# Patient Record
Sex: FEMALE | Race: WHITE | NOT HISPANIC OR LATINO | Employment: FULL TIME | URBAN - METROPOLITAN AREA
[De-identification: names, ages, dates, MRNs, and addresses within clinical notes are randomized per-mention and may not be internally consistent; named-entity substitution may affect disease eponyms.]

---

## 2017-02-13 ENCOUNTER — HOSPITAL ENCOUNTER (OUTPATIENT)
Dept: OCCUPATIONAL THERAPY | Facility: CLINIC | Age: 37
Setting detail: THERAPIES SERIES
End: 2017-02-13
Attending: PHYSICIAN ASSISTANT
Payer: COMMERCIAL

## 2017-02-13 PROCEDURE — 97140 MANUAL THERAPY 1/> REGIONS: CPT | Mod: GO | Performed by: OCCUPATIONAL THERAPIST

## 2017-02-13 PROCEDURE — 97165 OT EVAL LOW COMPLEX 30 MIN: CPT | Mod: GO | Performed by: OCCUPATIONAL THERAPIST

## 2017-02-13 PROCEDURE — 40000445 ZZHC STATISTIC OT VISIT, LYMPHEDEMA: Performed by: OCCUPATIONAL THERAPIST

## 2017-02-14 NOTE — PROGRESS NOTES
02/13/17 1500   Rehab Discipline   Discipline OT   Type of Visit   Type of visit Initial Edema Evaluation       present No   General Information   Start of care 02/13/17   Referring physician Madeline Otto PA-C   Orders Evaluate and treat as indicated   Order date 12/20/16   Medical diagnosis BLE lymphedema   Onset of illness / date of surgery 12/20/16   Edema onset 02/13/05   Affected body parts LLE;RLE   Edema etiology Congenital   Edema etiology comments Pt has had since puberty.  Her father and son also have it.   Pertinent history of current problem (PT: include personal factors and/or comorbidities that impact the POC; OT: include additional occupational profile info) Pt has congenital lymphedema.   Surgical / medical history reviewed Yes   Edema special tests Lymphoscintography  (Pt reports no leg lns.)   Prior level of functional mobility Pt is independent with ADLs.  She has a hard time finding shoes and socks and pants to fit over her lower legs.  She cannot find boots to wear in the winter.  Climbing stairs is difficult with the heaviness of the legs.  She gets high pain 8.5/10 after being on her feet for work.  She gets frequent cellulitis infections.  2 in the past month.   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   Fall Screening   Fall screen completed by OT   Per patient, fall 2 or more times in past year? Yes   Per patient, fall with injury in past year? No   Is patient a fall risk? No   Fall screen comments Falls only on ice   System Outcome Measures   Outcome  Measures Lymphedema   Lymphedema Life Impact Scale (score range 0-72). A higher score indicates greater impairment. 40   Subjective Report   Patient report of symptoms Pt has high pain from working on feet.   Patient / Family Goals   Patient / family goals statement Pt wishes to have less pain at the end of her work day.  She would love to find pants to fit on her thighs and hips after they fit the calves.   Pain   Patient currently in pain Yes   Pain location B feet   Pain rating 1-2/10 current, but flares to 8/10 with work.   Pain description Ache   Cognitive Status   Orientation Orientation to person, place and time   Level of consciousness Alert   Follows commands and answers questions 100% of the time   Personal safety and judgement Intact   Memory Intact   Edema Exam / Assessment   Skin condition Pitting;Dryness   Skin condition comments Pt has thick fibrosis from toes to knees.  She has peau d orange texture on BLES.  She has lichenization of hyperkeratosis of the L toes, a stage III skin change.   Pitting 4+   Pitting location pretibial and foot   Scar No   Capillary refill Symmetrical   Dorsal pedal pulse Symmetrical   Stemmer sign Positive   Stemmer sign comments In BLEs.   Ulceration No   Ulceration comments Pt has multiple scratches and nonhealing spots from shaving/bumping into a table.   Girth Measurements   Girth Measurements Refer to separate girth measurement flowsheet   Volume LE   Right LE (mL) 20176   Left LE (mL) 50009   LE volume comparison LLE volume greater than RLE volume   % difference 5   Range of Motion   ROM Other   ROM comments Pt is limited at ankle and knee by swelling.   Strength   Strength No deficits were identified   Posture   Posture Normal   Palpation   Palpation Pt has no pain on palpation.   Activities of Daily Living   Activities of Daily Living Pt has high pain with work days.  She cannot fit into pants easily.   Bed Mobility   Bed mobility Pt is independent.   Transfers  "  Transfers Pt is independent.   Gait / Locomotion   Gait / Locomotion Pt is independent.  She is walking on the lateral aspect of her feet to make room for the calves with gait.   Sensory   Sensory perception Light touch   Light touch Intact   Vascular Assessment   Vascular Assessment Comments no concerns   Coordination   Coordination Gross motor coordination appropriate   Muscle Tone   Muscle tone No deficits were identified   Planned Edema Interventions   Planned edema interventions Manual lymph drainage;Gradient compression bandaging;Fit for compression garment;Exercises;Precautions to prevent infection / exacerbation;Education;Manual therapy;ADL training;Skin care / precautions;Home management program development   Clinical Impression   Criteria for skilled therapeutic intervention met Yes   Therapy diagnosis Lymphedema   Influenced by the following impairments / conditions Edema;Stage 3;Primary Lymphedema   Assessment of Occupational Performance 3-5 Performance Deficits   Identified Performance Deficits Work pain, shoes/boots to fit, pants to fit, legs are heavy to climb stairs.   Clinical Decision Making (Complexity) Moderate complexity   Treatment frequency 5 times / week   Treatment duration 4 weeks, then 0x/week for 3 weeks, then once a week for one week.   Patient / family and/or staff in agreement with plan of care Yes   Risks and benefits of therapy have been explained Yes   Education Assessment   Preferred learning style Listening;Reading;Demonstration;Pictures / video   Goals   Edema Eval Goals 5;6   Goal 1   Goal identifier 1   Goal description In order to improve functional mobility for activities of living, by the completion of intensive treatment, patient and/or caregiver will;\"Verbalize and/or demonstrate understanding of techniques to independently manage their lymphedema at home   Target date 04/14/17   Goal 2   Goal identifier 1a   Goal description tolerate gradient compression " bandaging/wearing compression garments 23 hrs/day to decrease volume of extracellular fluid   Target date 04/14/17   Goal 3   Goal identifier 1b   Goal description demonstrate independence in applying gradient compression bandages    Target date 04/14/17   Goal 4   Goal identifier 1c   Goal description demonstrate independence in performing prescribed exercises to facilate the lymph system   Target date 04/14/17   Goal 5   Goal identifier 1d   Goal description be independent in donning/doffing, wearing schedule, and care of compression garments   Target date 04/14/17   Goal 6   Goal identifier 2   Goal description Pt will reduce legs below knee 5% of total girth to decrease work pain to 4/10 end of shift.   Target date 04/14/17   Total Evaluation Time   Total evaluation time 45

## 2017-02-20 ENCOUNTER — HOSPITAL ENCOUNTER (OUTPATIENT)
Dept: OCCUPATIONAL THERAPY | Facility: CLINIC | Age: 37
Setting detail: THERAPIES SERIES
End: 2017-02-20
Attending: PHYSICIAN ASSISTANT
Payer: COMMERCIAL

## 2017-02-20 PROCEDURE — 40000445 ZZHC STATISTIC OT VISIT, LYMPHEDEMA: Performed by: OCCUPATIONAL THERAPIST

## 2017-02-20 PROCEDURE — 97140 MANUAL THERAPY 1/> REGIONS: CPT | Mod: GO | Performed by: OCCUPATIONAL THERAPIST

## 2017-02-21 ENCOUNTER — HOSPITAL ENCOUNTER (OUTPATIENT)
Dept: OCCUPATIONAL THERAPY | Facility: CLINIC | Age: 37
Setting detail: THERAPIES SERIES
End: 2017-02-21
Attending: PHYSICIAN ASSISTANT
Payer: COMMERCIAL

## 2017-02-21 PROCEDURE — 40000445 ZZHC STATISTIC OT VISIT, LYMPHEDEMA: Performed by: OCCUPATIONAL THERAPIST

## 2017-02-21 PROCEDURE — 97140 MANUAL THERAPY 1/> REGIONS: CPT | Mod: GO | Performed by: OCCUPATIONAL THERAPIST

## 2017-02-22 ENCOUNTER — HOSPITAL ENCOUNTER (OUTPATIENT)
Dept: OCCUPATIONAL THERAPY | Facility: CLINIC | Age: 37
Setting detail: THERAPIES SERIES
End: 2017-02-22
Attending: PHYSICIAN ASSISTANT
Payer: COMMERCIAL

## 2017-02-22 PROCEDURE — 40000445 ZZHC STATISTIC OT VISIT, LYMPHEDEMA: Performed by: OCCUPATIONAL THERAPIST

## 2017-02-22 PROCEDURE — 97140 MANUAL THERAPY 1/> REGIONS: CPT | Mod: GO | Performed by: OCCUPATIONAL THERAPIST

## 2017-02-23 ENCOUNTER — HOSPITAL ENCOUNTER (OUTPATIENT)
Dept: OCCUPATIONAL THERAPY | Facility: CLINIC | Age: 37
Setting detail: THERAPIES SERIES
End: 2017-02-23
Attending: PHYSICIAN ASSISTANT
Payer: COMMERCIAL

## 2017-02-23 PROCEDURE — 40000445 ZZHC STATISTIC OT VISIT, LYMPHEDEMA: Performed by: OCCUPATIONAL THERAPIST

## 2017-02-23 PROCEDURE — 97140 MANUAL THERAPY 1/> REGIONS: CPT | Mod: GO | Performed by: OCCUPATIONAL THERAPIST

## 2017-02-24 ENCOUNTER — HOSPITAL ENCOUNTER (OUTPATIENT)
Dept: OCCUPATIONAL THERAPY | Facility: CLINIC | Age: 37
Setting detail: THERAPIES SERIES
End: 2017-02-24
Attending: PHYSICIAN ASSISTANT
Payer: COMMERCIAL

## 2017-02-24 PROCEDURE — 97140 MANUAL THERAPY 1/> REGIONS: CPT | Mod: GO | Performed by: OCCUPATIONAL THERAPIST

## 2017-02-24 PROCEDURE — 40000445 ZZHC STATISTIC OT VISIT, LYMPHEDEMA: Performed by: OCCUPATIONAL THERAPIST

## 2017-02-27 ENCOUNTER — HOSPITAL ENCOUNTER (OUTPATIENT)
Dept: OCCUPATIONAL THERAPY | Facility: CLINIC | Age: 37
Setting detail: THERAPIES SERIES
End: 2017-02-27
Attending: PHYSICIAN ASSISTANT
Payer: COMMERCIAL

## 2017-02-27 PROCEDURE — 97140 MANUAL THERAPY 1/> REGIONS: CPT | Mod: GO | Performed by: OCCUPATIONAL THERAPIST

## 2017-02-27 PROCEDURE — 40000445 ZZHC STATISTIC OT VISIT, LYMPHEDEMA: Performed by: OCCUPATIONAL THERAPIST

## 2017-02-28 ENCOUNTER — HOSPITAL ENCOUNTER (OUTPATIENT)
Dept: OCCUPATIONAL THERAPY | Facility: CLINIC | Age: 37
Setting detail: THERAPIES SERIES
End: 2017-02-28
Attending: PHYSICIAN ASSISTANT
Payer: COMMERCIAL

## 2017-02-28 PROCEDURE — 97140 MANUAL THERAPY 1/> REGIONS: CPT | Mod: GO | Performed by: OCCUPATIONAL THERAPIST

## 2017-02-28 PROCEDURE — 40000445 ZZHC STATISTIC OT VISIT, LYMPHEDEMA: Performed by: OCCUPATIONAL THERAPIST

## 2017-03-02 ENCOUNTER — HOSPITAL ENCOUNTER (OUTPATIENT)
Dept: OCCUPATIONAL THERAPY | Facility: CLINIC | Age: 37
Setting detail: THERAPIES SERIES
End: 2017-03-02
Attending: PHYSICIAN ASSISTANT
Payer: COMMERCIAL

## 2017-03-02 PROCEDURE — 40000445 ZZHC STATISTIC OT VISIT, LYMPHEDEMA: Performed by: OCCUPATIONAL THERAPIST

## 2017-03-02 PROCEDURE — 97140 MANUAL THERAPY 1/> REGIONS: CPT | Mod: GO | Performed by: OCCUPATIONAL THERAPIST

## 2017-03-03 ENCOUNTER — HOSPITAL ENCOUNTER (OUTPATIENT)
Dept: OCCUPATIONAL THERAPY | Facility: CLINIC | Age: 37
Setting detail: THERAPIES SERIES
End: 2017-03-03
Attending: PHYSICIAN ASSISTANT
Payer: COMMERCIAL

## 2017-03-03 PROCEDURE — 97110 THERAPEUTIC EXERCISES: CPT | Mod: GO | Performed by: OCCUPATIONAL THERAPIST

## 2017-03-03 PROCEDURE — 40000445 ZZHC STATISTIC OT VISIT, LYMPHEDEMA: Performed by: OCCUPATIONAL THERAPIST

## 2017-03-03 PROCEDURE — 97140 MANUAL THERAPY 1/> REGIONS: CPT | Mod: GO | Performed by: OCCUPATIONAL THERAPIST

## 2017-03-07 ENCOUNTER — HOSPITAL ENCOUNTER (OUTPATIENT)
Dept: OCCUPATIONAL THERAPY | Facility: CLINIC | Age: 37
Setting detail: THERAPIES SERIES
End: 2017-03-07
Attending: PHYSICIAN ASSISTANT
Payer: COMMERCIAL

## 2017-03-07 PROCEDURE — 40000445 ZZHC STATISTIC OT VISIT, LYMPHEDEMA: Performed by: OCCUPATIONAL THERAPIST

## 2017-03-07 PROCEDURE — 97140 MANUAL THERAPY 1/> REGIONS: CPT | Mod: GO | Performed by: OCCUPATIONAL THERAPIST

## 2017-03-08 ENCOUNTER — HOSPITAL ENCOUNTER (OUTPATIENT)
Dept: OCCUPATIONAL THERAPY | Facility: CLINIC | Age: 37
Setting detail: THERAPIES SERIES
End: 2017-03-08
Attending: PHYSICIAN ASSISTANT
Payer: COMMERCIAL

## 2017-03-08 PROCEDURE — 40000445 ZZHC STATISTIC OT VISIT, LYMPHEDEMA: Performed by: OCCUPATIONAL THERAPIST

## 2017-03-08 PROCEDURE — 97140 MANUAL THERAPY 1/> REGIONS: CPT | Mod: GO | Performed by: OCCUPATIONAL THERAPIST

## 2017-03-09 ENCOUNTER — HOSPITAL ENCOUNTER (OUTPATIENT)
Dept: OCCUPATIONAL THERAPY | Facility: CLINIC | Age: 37
Setting detail: THERAPIES SERIES
End: 2017-03-09
Attending: PHYSICIAN ASSISTANT
Payer: COMMERCIAL

## 2017-03-09 PROCEDURE — 97140 MANUAL THERAPY 1/> REGIONS: CPT | Mod: GO | Performed by: OCCUPATIONAL THERAPIST

## 2017-03-09 PROCEDURE — 40000445 ZZHC STATISTIC OT VISIT, LYMPHEDEMA: Performed by: OCCUPATIONAL THERAPIST

## 2017-03-10 ENCOUNTER — HOSPITAL ENCOUNTER (OUTPATIENT)
Dept: OCCUPATIONAL THERAPY | Facility: CLINIC | Age: 37
Setting detail: THERAPIES SERIES
End: 2017-03-10
Attending: PHYSICIAN ASSISTANT
Payer: COMMERCIAL

## 2017-03-10 PROCEDURE — 40000445 ZZHC STATISTIC OT VISIT, LYMPHEDEMA: Performed by: OCCUPATIONAL THERAPIST

## 2017-03-10 PROCEDURE — 97140 MANUAL THERAPY 1/> REGIONS: CPT | Mod: GO | Performed by: OCCUPATIONAL THERAPIST

## 2017-03-14 ENCOUNTER — HOSPITAL ENCOUNTER (OUTPATIENT)
Dept: OCCUPATIONAL THERAPY | Facility: CLINIC | Age: 37
Setting detail: THERAPIES SERIES
End: 2017-03-14
Attending: PHYSICIAN ASSISTANT
Payer: COMMERCIAL

## 2017-03-14 PROCEDURE — 40000445 ZZHC STATISTIC OT VISIT, LYMPHEDEMA: Performed by: OCCUPATIONAL THERAPIST

## 2017-03-14 PROCEDURE — 97140 MANUAL THERAPY 1/> REGIONS: CPT | Mod: GO | Performed by: OCCUPATIONAL THERAPIST

## 2017-03-16 ENCOUNTER — HOSPITAL ENCOUNTER (OUTPATIENT)
Dept: OCCUPATIONAL THERAPY | Facility: CLINIC | Age: 37
Setting detail: THERAPIES SERIES
End: 2017-03-16
Attending: PHYSICIAN ASSISTANT
Payer: COMMERCIAL

## 2017-03-16 PROCEDURE — 97140 MANUAL THERAPY 1/> REGIONS: CPT | Mod: GO | Performed by: OCCUPATIONAL THERAPIST

## 2017-03-16 PROCEDURE — 40000445 ZZHC STATISTIC OT VISIT, LYMPHEDEMA: Performed by: OCCUPATIONAL THERAPIST

## 2017-03-24 ENCOUNTER — HOSPITAL ENCOUNTER (OUTPATIENT)
Dept: OCCUPATIONAL THERAPY | Facility: CLINIC | Age: 37
Setting detail: THERAPIES SERIES
End: 2017-03-24
Attending: PHYSICIAN ASSISTANT
Payer: COMMERCIAL

## 2017-03-24 PROCEDURE — 97140 MANUAL THERAPY 1/> REGIONS: CPT | Mod: GO | Performed by: OCCUPATIONAL THERAPIST

## 2017-03-24 PROCEDURE — 40000445 ZZHC STATISTIC OT VISIT, LYMPHEDEMA: Performed by: OCCUPATIONAL THERAPIST

## 2017-03-30 ENCOUNTER — HOSPITAL ENCOUNTER (OUTPATIENT)
Dept: OCCUPATIONAL THERAPY | Facility: CLINIC | Age: 37
Setting detail: THERAPIES SERIES
End: 2017-03-30
Attending: PHYSICIAN ASSISTANT
Payer: COMMERCIAL

## 2017-03-30 PROCEDURE — 40000445 ZZHC STATISTIC OT VISIT, LYMPHEDEMA: Performed by: OCCUPATIONAL THERAPIST

## 2017-03-30 PROCEDURE — 97140 MANUAL THERAPY 1/> REGIONS: CPT | Mod: GO | Performed by: OCCUPATIONAL THERAPIST

## 2017-03-31 ENCOUNTER — HOSPITAL ENCOUNTER (OUTPATIENT)
Dept: OCCUPATIONAL THERAPY | Facility: CLINIC | Age: 37
Setting detail: THERAPIES SERIES
End: 2017-03-31
Attending: PHYSICIAN ASSISTANT
Payer: COMMERCIAL

## 2017-03-31 PROCEDURE — 97140 MANUAL THERAPY 1/> REGIONS: CPT | Mod: GO | Performed by: OCCUPATIONAL THERAPIST

## 2017-03-31 PROCEDURE — 40000445 ZZHC STATISTIC OT VISIT, LYMPHEDEMA: Performed by: OCCUPATIONAL THERAPIST

## 2017-05-12 ENCOUNTER — HOSPITAL ENCOUNTER (OUTPATIENT)
Dept: OCCUPATIONAL THERAPY | Facility: CLINIC | Age: 37
Setting detail: THERAPIES SERIES
End: 2017-05-12
Attending: PHYSICIAN ASSISTANT
Payer: COMMERCIAL

## 2017-05-12 PROCEDURE — 97140 MANUAL THERAPY 1/> REGIONS: CPT | Mod: GO | Performed by: OCCUPATIONAL THERAPIST

## 2017-05-12 PROCEDURE — 40000445 ZZHC STATISTIC OT VISIT, LYMPHEDEMA: Performed by: OCCUPATIONAL THERAPIST

## 2017-05-15 NOTE — ADDENDUM NOTE
Encounter addended by: Carmen Forbes OT on: 5/15/2017  9:44 AM<BR>     Actions taken: Flowsheet accepted, Sign clinical note, Episode resolved

## 2017-05-15 NOTE — PROGRESS NOTES
"Outpatient Occupational Therapy Discharge Note     Patient: Aníbal Morris  : 1980  Insurance:   Payor/Plan Subscriber Name Rel Member # Group #   MEDICA - MEDICA ANÍBAL HOPKINS  [id number]       [id number]       Beginning/End Dates of Reporting Period:  17 to 5/15/2017    Referring Provider: Madeline Otto PA-C    Therapy Diagnosis: Lymphedema BLEs.    Client Self Report:   Pt reports less pain 1-2/10 end of shift.  She reports less tiredness in standing end of shift withlighter legs.    Objective Measurements:      Pt lost a total of 15% of girth in the RLE and 27% in the LLE.  Her RLE has increased some since last session.  The thigh is filling in and she will start GCB to the R thigh.    Outcome Measures (most recent score):  Lymphedema Life Impact Scale (score range 0-72). A higher score indicates greater impairment.: 24    Goals:   Goal Identifier 1   Goal Description In order to improve functional mobility for activities of living, by the completion of intensive treatment, patient and/or caregiver will;\"Verbalize and/or demonstrate understanding of techniques to independently manage their lymphedema at home   Target Date 17   Date Met  17   Progress:goal met     Goal Identifier 1a   Goal Description tolerate gradient compression bandaging/wearing compression garments 23 hrs/day to decrease volume of extracellular fluid   Target Date 17   Date Met  17   Progress:goal met     Goal Identifier 1b   Goal Description demonstrate independence in applying gradient compression bandages    Target Date 17   Date Met  17   Progress:goal met     Goal Identifier 1c   Goal Description demonstrate independence in performing prescribed exercises to facilate the lymph system   Target Date 17   Date Met  17   Progress:goal met     Goal Identifier 1d   Goal Description be independent in donning/doffing, wearing schedule, and care of compression garments "   Target Date 04/14/17   Date Met  05/12/17   Progress:goal met     Goal Identifier 2   Goal Description Pt will reduce legs below knee 5% of total girth to decrease work pain to 4/10 end of shift.   Target Date 04/14/17   Date Met  03/03/17   Progress:goal met   Progress Toward Goals:   Progress this reporting period: Pt was seen for 20 sessions since SOC.  She is independent with GCB nightly (she is night shifts, so really daily).  She does a multi layer moreso than most people with lymphedema.  She is noted without a stretchiness of the bandages today and will watch her soap to see if it is changing their quality.  She does the garments daily and they fit nicely if not a bit long.  She has ordered a second set and knows to cont ordering these.  She is motivated to cont taking care of her legs.  She does her ex and MLD daily.Her pain has reduced and she is more comfortable in her job.  She lost a total of 2951mL in the RLE best was 5146mL and in the LLE 5694mL.      Plan:  Discharge from therapy.    Discharge:    Reason for Discharge: Patient has met all goals.    Equipment Issued: GCB, garments    Discharge Plan: Patient to continue home program.

## 2021-03-30 ENCOUNTER — TRANSCRIBE ORDERS (OUTPATIENT)
Dept: OTHER | Age: 41
End: 2021-03-30

## 2021-03-30 DIAGNOSIS — Q82.0 HEREDITARY LYMPHEDEMA OF LEGS: Primary | ICD-10-CM

## 2021-05-07 ENCOUNTER — HOSPITAL ENCOUNTER (OUTPATIENT)
Dept: OCCUPATIONAL THERAPY | Facility: CLINIC | Age: 41
Setting detail: THERAPIES SERIES
End: 2021-05-07
Attending: FAMILY MEDICINE
Payer: COMMERCIAL

## 2021-05-07 PROCEDURE — 97140 MANUAL THERAPY 1/> REGIONS: CPT | Mod: GO | Performed by: OCCUPATIONAL THERAPIST

## 2021-05-07 PROCEDURE — 97165 OT EVAL LOW COMPLEX 30 MIN: CPT | Mod: GO | Performed by: OCCUPATIONAL THERAPIST

## 2021-05-07 NOTE — PROGRESS NOTES
05/07/21 1500   Rehab Discipline   Discipline OT   Type of Visit   Type of visit Initial Edema Evaluation       present No   General Information   Start of care 05/07/21   Referring physician Dr. Tina Capps of [facility]   Orders Evaluate and treat as indicated   Order date 09/25/20   Medical diagnosis Lymphedema   Onset of illness / date of surgery 09/25/20   Edema onset 05/07/13   Affected body parts LLE;RLE   Edema etiology Congenital   Edema etiology comments Family hx, father, son   Pertinent history of current problem (PT: include personal factors and/or comorbidities that impact the POC; OT: include additional occupational profile info) Pt has had swelling from childhood.  She has been through tx before.  She has no supplies as she has not had tx since 2017.  She needs new wraps, garments after she is small and a repeat of the intensive therapy.  She has recently lost 60# and is ready to take care of her legs.   Surgical / medical history reviewed Yes   Edema special tests Lymphoscintography   Prior level of functional mobility Pt lives and works independently.  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 No   Have you fallen and had an injury in the past year? No   Is patient a fall risk? No   Abuse Screen (yes response referral indicated)   Feels Unsafe at Home or Work/School no   Feels Threatened by Someone no   Does Anyone Try to Keep You From Having Contact with Others or Doing Things Outside Your Home? no   Physical Signs of Abuse Present no   System Outcome Measures    Outcome Measures Lymphedema   Lymphedema Life Impact Scale (score range 0-72). A higher score indicates greater impairment. 27   Subjective Report   Patient report of symptoms Pt has pain at times at end of work shift.  She has trouble finding pants to fit.  She finds dating and socializing difficult.     Precautions   Precautions comments none   Patient / Family Goals   Patient / family goals statement Pt wishes to get the legs down, get new garments, and have less pain at the end of her work shifts.   Pain   Patient currently in pain Yes   Pain location BLEs   Pain rating 4/10   Pain description Ache   Pain description comment end of shifts   Cognitive Status   Orientation Orientation to person, place and time   Level of consciousness Alert   Follows commands and answers questions 100% of the time   Personal safety and judgement Intact   Memory Intact   Edema Exam / Assessment   Skin condition Pitting;Dryness;Intact   Skin condition comments Pt has lichensclerosis of the toes/MTPs.  She has fibrosis from toes to knee.   Pitting 3+   Pitting location pretibial   Scar No   Capillary refill Symmetrical   Dorsal pedal pulse Symmetrical   Stemmer sign Positive   Stemmer sign comments in BLE.s   Ulceration No   Girth Measurements   Girth Measurements Refer to separate girth measurement flowsheet   Volume LE   Right LE (mL) 44393   Left LE (mL) 55958   LE volume comparison RLE volume greater than LLE volume   % difference 8   Range of Motion   ROM Other   ROM comments limited at ankle   Strength   Strength No deficits were identified   Posture   Posture Normal   Palpation   Palpation No pain on palpation   Activities of Daily Living   Activities of Daily Living Pt is independent.   Bed Mobility   Bed mobility Pt is independent   Transfers   Transfers Pt is independent.   Gait / Locomotion   Gait / Locomotion Pt is independent.   Sensory   Sensory perception Light touch   Light touch Intact   Vascular Assessment    Vascular Assessment Comments no concerns   Coordination   Coordination Gross motor coordination appropriate   Muscle Tone   Muscle tone No deficits were identified   Planned Edema Interventions   Planned edema interventions Manual lymph drainage;Gradient compression bandaging;Fit for compression garment;Exercises;Precautions to prevent infection / exacerbation;Education;Manual therapy;ADL training;Skin care / precautions;Home management program development   Clinical Impression   Criteria for skilled therapeutic intervention met Yes   Therapy diagnosis BLE lymphedema   Influenced by the following impairments / conditions Edema;Stage 2   Assessment of Occupational Performance 1-3 Performance Deficits   Identified Performance Deficits pain, shoe fit, home program   Clinical Decision Making (Complexity) Low complexity   Treatment Frequency 3x/week   Treatment duration 2 weeks, then 5x/week, then 4 x week    Patient / family and/or staff in agreement with plan of care Yes   Risks and benefits of therapy have been explained Yes   Education Assessment   Preferred learning style Listening;Reading;Demonstration;Pictures / video   Barriers to learning No barriers   Goals   Edema Eval Goals 1;2   Goal 1   Goal identifier 1   Goal description Pt will be independent with home program modifications for best reduction to new garments.   Target date 07/01/21   Goal 2   Goal identifier 2   Goal description Pt will reduce the legs to be free of pain with work shift.   Target date 07/01/21   Total Evaluation Time   OT Vitaliy Low Complexity Minutes (98922) 45

## 2021-05-10 NOTE — PROGRESS NOTES
[facility]        OUTPATIENT OCCUPATIONAL THERAPY EDEMA EVALUATION  PLAN OF TREATMENT FOR OUTPATIENT REHABILITATION  (COMPLETE FOR INITIAL CLAIMS ONLY)  Patient's Last Name, First Name, Jaqui Bronson                           Provider s Name:   [facility] Medical Record No.  [id number]     Start of Care Date:  05/07/21   Onset Date:  05/07/13   Type:  OT   Medical Diagnosis:  Lymphedema   Therapy Diagnosis:  BLE lymphedema Visits from SOC:  1                                     __________________________________________________________________________________   Plan of Treatment/Functional Goals:    Manual lymph drainage, Gradient compression bandaging, Fit for compression garment, Exercises, Precautions to prevent infection / exacerbation, Education, Manual therapy, ADL training, Skin care / precautions, Home management program development        GOALS  1. Goal description: Pt will be independent with home program modifications for best reduction to new garments.       Target date: 07/01/21  2. Goal description: Pt will reduce the legs to be free of pain with work shift.       Target date: 07/01/21    Treatment Frequency: 3x/week   Treatment duration: 2 weeks, then 5x/week, then 4 x week     Carmen Forbes OT                                    I CERTIFY THE NEED FOR THESE SERVICES FURNISHED UNDER        THIS PLAN OF TREATMENT AND WHILE UNDER MY CARE .             Physician Signature               Date    X_____________________________________________________                        Certification date from: 05/07/21       Certification date to: 07/01/21           Referring physician: Dr. Tina Capps of [facility]   Initial Assessment  See Epic Evaluation- Start of care: 05/07/21

## 2021-05-27 ENCOUNTER — HOSPITAL ENCOUNTER (OUTPATIENT)
Dept: OCCUPATIONAL THERAPY | Facility: CLINIC | Age: 41
Setting detail: THERAPIES SERIES
End: 2021-05-27
Attending: FAMILY MEDICINE
Payer: COMMERCIAL

## 2021-05-27 PROCEDURE — 97140 MANUAL THERAPY 1/> REGIONS: CPT | Mod: GO | Performed by: OCCUPATIONAL THERAPIST

## 2021-06-15 ENCOUNTER — HOSPITAL ENCOUNTER (OUTPATIENT)
Dept: OCCUPATIONAL THERAPY | Facility: CLINIC | Age: 41
Setting detail: THERAPIES SERIES
End: 2021-06-15
Attending: FAMILY MEDICINE
Payer: COMMERCIAL

## 2021-06-15 PROCEDURE — 97140 MANUAL THERAPY 1/> REGIONS: CPT | Mod: GO | Performed by: OCCUPATIONAL THERAPIST

## 2021-06-22 ENCOUNTER — HOSPITAL ENCOUNTER (OUTPATIENT)
Dept: OCCUPATIONAL THERAPY | Facility: CLINIC | Age: 41
Setting detail: THERAPIES SERIES
End: 2021-06-22
Attending: FAMILY MEDICINE
Payer: COMMERCIAL

## 2021-06-22 PROCEDURE — 97140 MANUAL THERAPY 1/> REGIONS: CPT | Mod: GO | Performed by: OCCUPATIONAL THERAPIST

## 2021-06-23 ENCOUNTER — HOSPITAL ENCOUNTER (OUTPATIENT)
Dept: OCCUPATIONAL THERAPY | Facility: CLINIC | Age: 41
Setting detail: THERAPIES SERIES
End: 2021-06-23
Attending: FAMILY MEDICINE
Payer: COMMERCIAL

## 2021-06-23 PROCEDURE — 97140 MANUAL THERAPY 1/> REGIONS: CPT | Mod: GO | Performed by: OCCUPATIONAL THERAPIST

## 2021-06-24 ENCOUNTER — HOSPITAL ENCOUNTER (OUTPATIENT)
Dept: OCCUPATIONAL THERAPY | Facility: CLINIC | Age: 41
Setting detail: THERAPIES SERIES
End: 2021-06-24
Attending: FAMILY MEDICINE
Payer: COMMERCIAL

## 2021-06-24 PROCEDURE — 97140 MANUAL THERAPY 1/> REGIONS: CPT | Mod: GO | Performed by: OCCUPATIONAL THERAPIST

## 2021-06-25 ENCOUNTER — HOSPITAL ENCOUNTER (OUTPATIENT)
Dept: OCCUPATIONAL THERAPY | Facility: CLINIC | Age: 41
Setting detail: THERAPIES SERIES
End: 2021-06-25
Attending: FAMILY MEDICINE
Payer: COMMERCIAL

## 2021-06-25 PROCEDURE — 97140 MANUAL THERAPY 1/> REGIONS: CPT | Mod: GO | Performed by: OCCUPATIONAL THERAPIST

## 2021-06-29 ENCOUNTER — HOSPITAL ENCOUNTER (OUTPATIENT)
Dept: OCCUPATIONAL THERAPY | Facility: CLINIC | Age: 41
Setting detail: THERAPIES SERIES
End: 2021-06-29
Attending: FAMILY MEDICINE
Payer: COMMERCIAL

## 2021-06-29 PROCEDURE — 97140 MANUAL THERAPY 1/> REGIONS: CPT | Mod: GO | Performed by: OCCUPATIONAL THERAPIST

## 2021-07-01 ENCOUNTER — HOSPITAL ENCOUNTER (OUTPATIENT)
Dept: OCCUPATIONAL THERAPY | Facility: CLINIC | Age: 41
Setting detail: THERAPIES SERIES
End: 2021-07-01
Attending: FAMILY MEDICINE
Payer: COMMERCIAL

## 2021-07-01 PROCEDURE — 97140 MANUAL THERAPY 1/> REGIONS: CPT | Mod: GO | Performed by: OCCUPATIONAL THERAPIST

## 2021-12-02 NOTE — PROGRESS NOTES
[facility]    Outpatient Occupational Therapy Discharge Note  Patient: Jaqui Morris  : 1980    Beginning/End Dates of Reporting Period:  21 to 21    Referring Provider: Dr. Capps    Therapy Diagnosis: Lymphedema    Client Self Report:  Pt did not report pain toward end of sessions.    Objective Measurements:      Pt reduced 2.5L in BLE.    Outcome Measures (most recent score):   not assessed - she did not attend follow up appt for score    Goals:   Goal Identifier 1   Goal Description Pt will be independent with home program modifications for best reduction to new garments.   Target Date 21   Date Met  21   Progress (detail required for progress note):  Goal met     Goal Identifier 2   Goal Description Pt will reduce the legs to be free of pain with work shift.   Target Date 21   Date Met      Progress (detail required for progress note):           Plan:  Discharge from therapy.    Discharge:    Reason for Discharge: Patient has met all goals.  She was seen for 9 sessions from [facility].  She was measured for compression socks and did not return for follow up.  She is independent with her home program and lost 2.5L    Equipment Issued: [facility]    Discharge Plan: Patient to continue home program.

## 2024-12-09 ENCOUNTER — TRANSCRIBE ORDERS (OUTPATIENT)
Dept: OTHER | Age: 44
End: 2024-12-09

## 2024-12-09 DIAGNOSIS — Q82.0 HEREDITARY LYMPHEDEMA OF LEGS: Primary | ICD-10-CM
